# Patient Record
Sex: FEMALE | Race: WHITE | URBAN - METROPOLITAN AREA
[De-identification: names, ages, dates, MRNs, and addresses within clinical notes are randomized per-mention and may not be internally consistent; named-entity substitution may affect disease eponyms.]

---

## 2021-08-31 NOTE — TELEPHONE ENCOUNTER
DIAGNOSIS: R knee, pt only seen by [facility]   APPOINTMENT DATE: 9.13.21   NOTES STATUS DETAILS   OFFICE NOTE from referring provider N/A    OFFICE NOTE from other specialist Internal 4.8.13 Dr Froilan Deutsch, [facility]  1.14.13  12.3.12  10.22.12  5.7.12  More in Epic   DISCHARGE SUMMARY from hospital N/A    DISCHARGE REPORT from the ER N/A    OPERATIVE REPORT N/A 10.5.12 Dr Deutsch   EMG report N/A    MEDICATION LIST Internal    MRI N/A    DEXA (osteoporosis/bone health) N/A    CT SCAN N/A    XRAYS (IMAGES & REPORTS) Internal

## 2021-09-10 DIAGNOSIS — M25.561 RIGHT KNEE PAIN, UNSPECIFIED CHRONICITY: Primary | ICD-10-CM

## 2021-09-13 ENCOUNTER — OFFICE VISIT (OUTPATIENT)
Dept: ORTHOPEDICS | Facility: CLINIC | Age: 31
End: 2021-09-13
Payer: COMMERCIAL

## 2021-09-13 ENCOUNTER — PRE VISIT (OUTPATIENT)
Dept: ORTHOPEDICS | Facility: CLINIC | Age: 31
End: 2021-09-13

## 2021-09-13 ENCOUNTER — ANCILLARY PROCEDURE (OUTPATIENT)
Dept: GENERAL RADIOLOGY | Facility: CLINIC | Age: 31
End: 2021-09-13
Attending: ORTHOPAEDIC SURGERY
Payer: COMMERCIAL

## 2021-09-13 VITALS — BODY MASS INDEX: 28.52 KG/M2 | WEIGHT: 155 LBS | HEIGHT: 62 IN

## 2021-09-13 DIAGNOSIS — G89.29 CHRONIC PAIN OF RIGHT KNEE: Primary | ICD-10-CM

## 2021-09-13 DIAGNOSIS — M25.561 CHRONIC PAIN OF RIGHT KNEE: Primary | ICD-10-CM

## 2021-09-13 DIAGNOSIS — M25.561 RIGHT KNEE PAIN, UNSPECIFIED CHRONICITY: ICD-10-CM

## 2021-09-13 PROCEDURE — 99204 OFFICE O/P NEW MOD 45 MIN: CPT | Performed by: ORTHOPAEDIC SURGERY

## 2021-09-13 PROCEDURE — 73562 X-RAY EXAM OF KNEE 3: CPT | Mod: RT | Performed by: RADIOLOGY

## 2021-09-13 RX ORDER — DICLOFENAC SODIUM 75 MG/1
75 TABLET, DELAYED RELEASE ORAL 2 TIMES DAILY
Qty: 60 TABLET | Refills: 0 | Status: SHIPPED | OUTPATIENT
Start: 2021-09-13

## 2021-09-13 ASSESSMENT — MIFFLIN-ST. JEOR: SCORE: 1376.33

## 2021-09-13 NOTE — PROGRESS NOTES
CHIEF CONCERN: R Knee Pain; Hx of OCD of Knee    HISTORY:   Ms. Alcala is a patient well known to Dr. Deutsch. She has had numerous procedures for OCD of her R knee. Dr. Deutsch last saw her 8 years ago following a right knee fresh osteochondral allograft transplantation for the medial femoral condyle and proximal tibial valgus producing osteotomy. Overall, she was doing very well until she became pregnant last year. Today, she is 5 months status post delivery of a baby boy. About 1 month following pregnancy, she reports increased pain and instability in her knee. The pain is intermittent with no known aggravating or alleviating factors. Her pain is throughout her whole knee and she is unable to localize the pain to originating from one specific area. Today in office, she reports pain as a 4/10 but states it gets to an 8/10 at its worse. She also reports intermittent feelings of instability that occur 2-3x/week. The instability has not caused her to fall. The onset of symptoms was insidious and she denies any trauma to the knee. She has not taken any pain medications. She has not tried PT. The symptoms have not progressed since their onset. 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 Her main concern today is to check the stability of her knee and quality of her prior graft.     PAST MEDICAL HISTORY: (Reviewed with the patient and in the EPIC medical record)  1. Osteochondritis Dissecans R knee    PAST SURGICAL HISTORY: (Reviewed with the patient and in the EPIC medical record)  1. Open fixation of OCD with Bio-composite screws 2/2008  2. Knee scope removal of loose body 9/2008  3. Knee scope removal of loose body 10/2008  4. Knee scope removal of loose body 3/2012  5. Allograft medial femoral condyle 10/2012    MEDICATIONS: (Reviewed with the patient and in the EPIC medical record)    Notable medications include: None    ALLERGIES: (Reviewed with the patient and in the EPIC medical  record)  1. NKDA    SOCIAL HISTORY: (Reviewed with the patient and in the medical record)  --Tobacco: Never used   --Occupation: Dance instructor     FAMILY HISTORY: (Reviewed with the patient and in the medical record)  -- No family history of bleeding, clotting, or difficulty with anesthesia    REVIEW OF SYSTEMS: (Reviewed with the patient and on the health intake form)  -- A comprehensive 10 point review of systems was conducted and is negative except as noted in the HPI    EXAM:   General: Awake, Alert and Oriented, No acute Distress. Articulate and Interactive    Body mass index is 28.35 kg/m .    Right Lower extremity :    Skin is Warm and Well perfused, no suggestion of infection    Completely healed surgical incisions of R knee with completely full ROM    Minimal effusion    No significant tenderness to palpation about the knee. No tenderness to palpation over the medial and lateral joint lines    No patellar instability     No ligamentous Laxity    EHL/FHL/TA/GS 5/5    Sensation intact L3-S1    2+ Dorsalis Pedis Pulse    IMAGING:  Plain Radiographs: AP, lateral and sunrise radiographs of the right knee obtained and independently reviewed today demonstrate stable post-surgical changes. Complete fusion of the proximal tibial wedge osteotomy. Knee alignment unchanged with no evidence of joint space narrowing or erosion. No new obvious OCD lesions. Stable osteophyte on medial condyle of femur that is unchanged from prior imaging.     ASSESSMENT:  1. Stable post-surgical changes following fresh allograft transplantation of the right knee medial femoral condyle and proximal tibial osteotomy    PLAN: At this time, it is difficult to answer whether the symptoms the patient is experiencing is her new normal or a result of her recent pregnancy. Overall, the health of her knee on radiographs is extremely reassuring. Based on the x-rays today, I don't see any new OCD lesions or obvious causes of her pain. At this time,  the patient has a lot on her plate with a  and would prefer to not have an operation at this time. She is willing to try non-operative treatment to see if things improve.   1. Prescribed a referral for Physical Therapy  2. Prescribed Diclofenac to help alleviate pain and swelling. Patient is going to check with her pediatrician to confirm this is safe to take while breastfeeding  3. Patient does not need to follow-up if symptoms improve. If symptoms don't improve or worsen, patient should call the office and we will order an MRI. I would then see her back in clinic once we have the results of the MRI for further discussion.     Note scribed by Tru Ramirez, MS4

## 2021-09-13 NOTE — NURSING NOTE
"Reason For Visit:   Chief Complaint   Patient presents with     Consult     right knee     Occupation: Dance Teacher  Currently working? Yes.  Work status?  Full time.    Date of surgery: 10/5/2012  Type of surgery:   PROCEDURES:   1.  Examination under anesthesia, right knee.   2.  Right knee arthroscopy.   3.  Fresh allograft transplantation, right knee medial femoral condyle.   4.  Right knee proximal tibial osteotomy.     Patient had son 5 months ago and has noticed after pregnancy, she has had continued pain throughout the entire right knee. She does report the knee swelling with activity or on her feet for a longer period of time. She will have episodes of instability (3x per week).     Ht 1.575 m (5' 2\")   Wt 70.3 kg (155 lb)   BMI 28.35 kg/m         No Known Allergies    Current Outpatient Medications   Medication     Norgestim-Eth Estrad Triphasic (ORTHO TRI-CYCLEN LO) 0.18/0.215/0.25 MG-25 MCG TABS     No current facility-administered medications for this visit.       Michelle Hobbs, ATC    "

## 2021-09-13 NOTE — LETTER
9/13/2021         RE: Zamzam Grimaldo  [id number]  [facility] [id number]        Dear Colleague,    Thank you for referring your patient, Zamzam Grimaldo, to the [facility] [place]. Please see a copy of my visit note below.    CHIEF CONCERN: R Knee Pain; Hx of OCD of Knee    HISTORY:   Ms. Alcala is a patient well known to Dr. Deutsch. She has had numerous procedures for OCD of her R knee. Dr. Deutsch last saw her 8 years ago following a right knee fresh osteochondral allograft transplantation for the medial femoral condyle and proximal tibial valgus producing osteotomy. Overall, she was doing very well until she became pregnant last year. Today, she is 5 months status post delivery of a baby boy. About 1 month following pregnancy, she reports increased pain and instability in her knee. The pain is intermittent with no known aggravating or alleviating factors. Her pain is throughout her whole knee and she is unable to localize the pain to originating from one specific area. Today in office, she reports pain as a 4/10 but states it gets to an 8/10 at its worse. She also reports intermittent feelings of instability that occur 2-3x/week. The instability has not caused her to fall. The onset of symptoms was insidious and she denies any trauma to the knee. She has not taken any pain medications. She has not tried PT. The symptoms have not progressed since their onset. 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 Her main concern today is to check the stability of her knee and quality of her prior graft.     PAST MEDICAL HISTORY: (Reviewed with the patient and in the [facility] medical record)  1. Osteochondritis Dissecans R knee    PAST SURGICAL HISTORY: (Reviewed with the patient and in the [facility] medical record)  1. Open fixation of OCD with Bio-composite screws 2/2008  2. Knee scope removal of loose body 9/2008  3. Knee scope removal of loose body 10/2008  4. Knee scope  removal of loose body 3/2012  5. Allograft medial femoral condyle 10/2012    MEDICATIONS: (Reviewed with the patient and in the EPIC medical record)    Notable medications include: None    ALLERGIES: (Reviewed with the patient and in the EPIC medical record)  1. NKDA    SOCIAL HISTORY: (Reviewed with the patient and in the medical record)  --Tobacco: Never used   --Occupation: Dance instructor     FAMILY HISTORY: (Reviewed with the patient and in the medical record)  -- No family history of bleeding, clotting, or difficulty with anesthesia    REVIEW OF SYSTEMS: (Reviewed with the patient and on the health intake form)  -- A comprehensive 10 point review of systems was conducted and is negative except as noted in the HPI    EXAM:   General: Awake, Alert and Oriented, No acute Distress. Articulate and Interactive    Body mass index is 28.35 kg/m .    Right Lower extremity :    Skin is Warm and Well perfused, no suggestion of infection    Completely healed surgical incisions of R knee with completely full ROM    Minimal effusion    No significant tenderness to palpation about the knee. No tenderness to palpation over the medial and lateral joint lines    No patellar instability     No ligamentous Laxity    EHL/FHL/TA/GS 5/5    Sensation intact L3-S1    2+ Dorsalis Pedis Pulse    IMAGING:  Plain Radiographs: AP, lateral and sunrise radiographs of the right knee obtained and independently reviewed today demonstrate stable post-surgical changes. Complete fusion of the proximal tibial wedge osteotomy. Knee alignment unchanged with no evidence of joint space narrowing or erosion. No new obvious OCD lesions. Stable osteophyte on medial condyle of femur that is unchanged from prior imaging.     ASSESSMENT:  1. Stable post-surgical changes following fresh allograft transplantation of the right knee medial femoral condyle and proximal tibial osteotomy    PLAN: At this time, it is difficult to answer whether the symptoms the  patient is experiencing is her new normal or a result of her recent pregnancy. Overall, the health of her knee on radiographs is extremely reassuring. Based on the x-rays today, I don't see any new OCD lesions or obvious causes of her pain. At this time, the patient has a lot on her plate with a  and would prefer to not have an operation at this time. She is willing to try non-operative treatment to see if things improve.   1. Prescribed a referral for Physical Therapy  2. Prescribed Diclofenac to help alleviate pain and swelling. Patient is going to check with her pediatrician to confirm this is safe to take while breastfeeding  3. Patient does not need to follow-up if symptoms improve. If symptoms don't improve or worsen, patient should call the office and we will order an MRI. I would then see her back in clinic once we have the results of the MRI for further discussion.     Note scribed by Tru Ramirez, MS4        Again, thank you for allowing me to participate in the care of your patient.        Sincerely,        Froilan Deutsch MD

## 2021-09-13 NOTE — LETTER
Date:September 21, 2021      Patient was self referred, no letter generated. Do not send.        [facility] Information